# Patient Record
Sex: FEMALE | Race: WHITE | NOT HISPANIC OR LATINO | Employment: FULL TIME | URBAN - METROPOLITAN AREA
[De-identification: names, ages, dates, MRNs, and addresses within clinical notes are randomized per-mention and may not be internally consistent; named-entity substitution may affect disease eponyms.]

---

## 2019-02-13 ENCOUNTER — TELEPHONE (OUTPATIENT)
Dept: SCHEDULING | Facility: IMAGING CENTER | Age: 29
End: 2019-02-13

## 2019-03-21 ENCOUNTER — OFFICE VISIT (OUTPATIENT)
Dept: MEDICAL GROUP | Facility: PHYSICIAN GROUP | Age: 29
End: 2019-03-21
Payer: COMMERCIAL

## 2019-03-21 VITALS
TEMPERATURE: 97.9 F | SYSTOLIC BLOOD PRESSURE: 130 MMHG | DIASTOLIC BLOOD PRESSURE: 90 MMHG | RESPIRATION RATE: 18 BRPM | OXYGEN SATURATION: 100 % | HEART RATE: 96 BPM | HEIGHT: 66 IN | BODY MASS INDEX: 21.39 KG/M2 | WEIGHT: 133.1 LBS

## 2019-03-21 DIAGNOSIS — N94.10 DYSPAREUNIA, FEMALE: ICD-10-CM

## 2019-03-21 DIAGNOSIS — Z23 NEED FOR VACCINATION: ICD-10-CM

## 2019-03-21 DIAGNOSIS — Z83.42 FAMILY HISTORY OF HIGH CHOLESTEROL: ICD-10-CM

## 2019-03-21 DIAGNOSIS — Z97.5 PRESENCE OF INTRAUTERINE CONTRACEPTIVE DEVICE (IUD): ICD-10-CM

## 2019-03-21 DIAGNOSIS — R23.2 FLUSHING: ICD-10-CM

## 2019-03-21 PROCEDURE — 90471 IMMUNIZATION ADMIN: CPT | Performed by: FAMILY MEDICINE

## 2019-03-21 PROCEDURE — 99204 OFFICE O/P NEW MOD 45 MIN: CPT | Mod: 25 | Performed by: FAMILY MEDICINE

## 2019-03-21 PROCEDURE — 90714 TD VACC NO PRESV 7 YRS+ IM: CPT | Performed by: FAMILY MEDICINE

## 2019-03-21 ASSESSMENT — PATIENT HEALTH QUESTIONNAIRE - PHQ9: CLINICAL INTERPRETATION OF PHQ2 SCORE: 0

## 2019-03-21 NOTE — ASSESSMENT & PLAN NOTE
She had a Paragaard placed Jan 2014.  She is thinking of having kids and would like to have it removed.

## 2019-03-21 NOTE — ASSESSMENT & PLAN NOTE
She had two episodes of severe pain during intercourse.  She had sudden onset of severe pain that resolved after a few days.  Pain was worse with pressure but she had intercourse again and was fun.  She did not have any bleeding at the time.  She has had one additional episode of pain but not since.

## 2019-03-28 PROBLEM — R23.2 FLUSHING: Status: ACTIVE | Noted: 2019-03-28

## 2019-03-28 NOTE — PROGRESS NOTES
CC:  Flushing, pain with sex    HISTORY OF THE PRESENT ILLNESS: Patient is a 28 y.o. female. This pleasant patient is here today to establish care and discuss the following    Health Maintenance: Completed      Presence of intrauterine contraceptive device (IUD)  She had a Paragaard placed Jan 2014.  She is thinking of having kids and would like to have it removed.          Dyspareunia, female  She had two episodes of severe pain during intercourse.  She had sudden onset of severe pain that resolved after a few days.  Pain was worse with pressure but she had intercourse again and was fun.  She did not have any bleeding at the time.  She has had one additional episode of pain but not since.      Flushing  She describes frequent flushing with alcohol consumption.  It is not with all types of alcohol but can be with higher amounts of intake.  She is not aware of any other food allergies and has not had any throat symptoms.  Flushing is always associated with alcohol intake.  She is having regular periods.      Allergies: Amoxicillin and Pcn [penicillins]    No current [facility]-ordered outpatient prescriptions on file.     No current [employer]-ordered facility-administered medications on file.        History reviewed. No pertinent past medical history.    Past Surgical History:   Procedure Laterality Date   • APPENDECTOMY CHILD     • MENISCUS REPAIR Left        Social History   Substance Use Topics   • Smoking status: Never Smoker   • Smokeless tobacco: Never Used   • Alcohol use 2.4 oz/week     4 Glasses of wine per week      Comment: Socially       Social History     Social History Narrative           Family History   Problem Relation Age of Onset   • Drug abuse Mother    • Heart Disease Father         unsure, not in contact   • Hyperlipidemia Father    • Osteoporosis Maternal Grandmother    • Breast Cancer Paternal Aunt        ROS:     - Constitutional: Negative for fever, chills, unexpected weight change, and  "fatigue/generalized weakness.     - HEENT: Negative for headaches, vision changes, hearing changes, ear pain, ear discharge, rhinorrhea, sinus congestion, sore throat, and neck pain.      - Respiratory: Negative for cough, sputum production, chest congestion, dyspnea, wheezing, and crackles.      - Cardiovascular: Negative for chest pain, palpitations, orthopnea, and bilateral lower extremity edema.     - Gastrointestinal: Negative for heartburn, nausea, vomiting, abdominal pain, hematochezia, melena, diarrhea, constipation, and greasy/foul-smelling stools.     - Genitourinary: Negative for dysuria, polyuria, hematuria, pyuria, urinary urgency, and urinary incontinence.    - Musculoskeletal: Negative for myalgias, back pain, and joint pain.     - Skin: Negative for rash, itching, cyanotic skin color change.     - Neurological: Negative for dizziness, tingling, tremors, focal sensory deficit, focal weakness and headaches.     - Endo/Heme/Allergies: Does not bruise/bleed easily.     - Psychiatric/Behavioral: Negative for depression, suicidal/homicidal ideation and memory loss.        Exam: Blood pressure 130/90, pulse 96, temperature 36.6 °C (97.9 °F), temperature source Temporal, resp. rate 18, height 1.676 m (5' 6\"), weight 60.4 kg (133 lb 1.6 oz), last menstrual period 03/21/2019, SpO2 100 %. Body mass index is 21.48 kg/m².    General: Normal appearing. No distress.  HEENT: Normocephalic. Eyes conjunctiva clear lids without ptosis, pupils equal and reactive to light accommodation, ears normal shape and contour, canals are clear bilaterally, tympanic membranes are benign, nasal mucosa benign, oropharynx is without erythema, edema or exudates.   Neck: Supple without JVD or bruit. Thyroid is not enlarged.  Pulmonary: Clear to ausculation.  Normal effort. No rales, ronchi, or wheezing.  Cardiovascular: Regular rate and rhythm without murmur. Carotid and radial pulses are intact and equal bilaterally.  Abdomen: Soft, " nontender, nondistended. Normal bowel sounds. Liver and spleen are not palpable  Neurologic: Grossly nonfocal  Lymph: No cervical, supraclavicular or axillary lymph nodes are palpable  Skin: Warm and dry.  No obvious lesions.  Musculoskeletal: Normal gait. No extremity cyanosis, clubbing, or edema.  Psych: Normal mood and affect. Alert and oriented x3. Judgment and insight is normal.    Please note that this dictation was created using voice recognition software. I have made every reasonable attempt to correct obvious errors, but I expect that there are errors of grammar and possibly content that I did not discover before finalizing the note.      Assessment/Plan  1. Presence of intrauterine contraceptive device (IUD)  She is considering having this removed.  This can be done in this office if imaging is normal.  Gyn is also reasonable.    2. Dyspareunia, female  She has had a few episodes of dyspareunia that resolved after 1 day or a few hours.  She has not had imaging at that has been evaluated after 1 of these episodes in the past.  Suspect this may represent a ruptured cyst or other than a more chronic issue.  Because she has an IUD onto his head several episodes I have ordered a pelvic ultrasound to evaluate for any abnormalities.  For onset of severe pain she should be seen again.  - US-PELVIC COMPLETE (TRANSABDOMINAL/TRANSVAGINAL) (COMBO); Future    3. Flushing  I suspect that this is just a reaction to alcohol and that she may have decreased metabolism.  I have advised her that most alcohol reactions are not allergies, but she is concerned because she is not can drink safely.  She does not seem to have as a consistent response to all types of alcohol.  For this reason I have referred her to allergy at her request.  - REFERRAL TO ALLERGY  - Comp Metabolic Panel; Future  - CBC WITH DIFFERENTIAL; Future    4. Family history of high cholesterol  She does have a family history of high cholesterol.  She has not  had any recent labs.  A screening cholesterol lab is ordered for monitoring.  - Lipid Profile; Future  - Comp Metabolic Panel; Future    5. Need for vaccination  - TD =>8yo IM

## 2019-03-28 NOTE — ASSESSMENT & PLAN NOTE
She describes frequent flushing with alcohol consumption.  It is not with all types of alcohol but can be with higher amounts of intake.  She is not aware of any other food allergies and has not had any throat symptoms.  Flushing is always associated with alcohol intake.  She is having regular periods.

## 2019-04-02 ENCOUNTER — HOSPITAL ENCOUNTER (OUTPATIENT)
Dept: LAB | Facility: MEDICAL CENTER | Age: 29
End: 2019-04-02
Attending: FAMILY MEDICINE
Payer: COMMERCIAL

## 2019-04-02 DIAGNOSIS — R23.2 FLUSHING: ICD-10-CM

## 2019-04-02 DIAGNOSIS — Z83.42 FAMILY HISTORY OF HIGH CHOLESTEROL: ICD-10-CM

## 2019-04-02 LAB
ALBUMIN SERPL BCP-MCNC: 4.2 G/DL (ref 3.2–4.9)
ALBUMIN/GLOB SERPL: 1.8 G/DL
ALP SERPL-CCNC: 45 U/L (ref 30–99)
ALT SERPL-CCNC: 10 U/L (ref 2–50)
ANION GAP SERPL CALC-SCNC: 7 MMOL/L (ref 0–11.9)
AST SERPL-CCNC: 16 U/L (ref 12–45)
BASOPHILS # BLD AUTO: 1.2 % (ref 0–1.8)
BASOPHILS # BLD: 0.07 K/UL (ref 0–0.12)
BILIRUB SERPL-MCNC: 0.7 MG/DL (ref 0.1–1.5)
BUN SERPL-MCNC: 12 MG/DL (ref 8–22)
CALCIUM SERPL-MCNC: 8.8 MG/DL (ref 8.5–10.5)
CHLORIDE SERPL-SCNC: 106 MMOL/L (ref 96–112)
CHOLEST SERPL-MCNC: 147 MG/DL (ref 100–199)
CO2 SERPL-SCNC: 25 MMOL/L (ref 20–33)
CREAT SERPL-MCNC: 0.73 MG/DL (ref 0.5–1.4)
EOSINOPHIL # BLD AUTO: 0.12 K/UL (ref 0–0.51)
EOSINOPHIL NFR BLD: 2.1 % (ref 0–6.9)
ERYTHROCYTE [DISTWIDTH] IN BLOOD BY AUTOMATED COUNT: 41.2 FL (ref 35.9–50)
FASTING STATUS PATIENT QL REPORTED: NORMAL
GLOBULIN SER CALC-MCNC: 2.4 G/DL (ref 1.9–3.5)
GLUCOSE SERPL-MCNC: 87 MG/DL (ref 65–99)
HCT VFR BLD AUTO: 43.6 % (ref 37–47)
HDLC SERPL-MCNC: 60 MG/DL
HGB BLD-MCNC: 14.1 G/DL (ref 12–16)
IMM GRANULOCYTES # BLD AUTO: 0.02 K/UL (ref 0–0.11)
IMM GRANULOCYTES NFR BLD AUTO: 0.3 % (ref 0–0.9)
LDLC SERPL CALC-MCNC: 71 MG/DL
LYMPHOCYTES # BLD AUTO: 2.35 K/UL (ref 1–4.8)
LYMPHOCYTES NFR BLD: 40.9 % (ref 22–41)
MCH RBC QN AUTO: 29.1 PG (ref 27–33)
MCHC RBC AUTO-ENTMCNC: 32.3 G/DL (ref 33.6–35)
MCV RBC AUTO: 90.1 FL (ref 81.4–97.8)
MONOCYTES # BLD AUTO: 0.63 K/UL (ref 0–0.85)
MONOCYTES NFR BLD AUTO: 11 % (ref 0–13.4)
NEUTROPHILS # BLD AUTO: 2.56 K/UL (ref 2–7.15)
NEUTROPHILS NFR BLD: 44.5 % (ref 44–72)
NRBC # BLD AUTO: 0 K/UL
NRBC BLD-RTO: 0 /100 WBC
PLATELET # BLD AUTO: 292 K/UL (ref 164–446)
PMV BLD AUTO: 10.1 FL (ref 9–12.9)
POTASSIUM SERPL-SCNC: 4.3 MMOL/L (ref 3.6–5.5)
PROT SERPL-MCNC: 6.6 G/DL (ref 6–8.2)
RBC # BLD AUTO: 4.84 M/UL (ref 4.2–5.4)
SODIUM SERPL-SCNC: 138 MMOL/L (ref 135–145)
TRIGL SERPL-MCNC: 78 MG/DL (ref 0–149)
WBC # BLD AUTO: 5.8 K/UL (ref 4.8–10.8)

## 2019-04-02 PROCEDURE — 85025 COMPLETE CBC W/AUTO DIFF WBC: CPT

## 2019-04-02 PROCEDURE — 36415 COLL VENOUS BLD VENIPUNCTURE: CPT

## 2019-04-02 PROCEDURE — 80053 COMPREHEN METABOLIC PANEL: CPT

## 2019-04-02 PROCEDURE — 80061 LIPID PANEL: CPT

## 2019-11-19 ENCOUNTER — OFFICE VISIT (OUTPATIENT)
Dept: URGENT CARE | Facility: CLINIC | Age: 29
End: 2019-11-19
Payer: COMMERCIAL

## 2019-11-19 VITALS
TEMPERATURE: 98.3 F | SYSTOLIC BLOOD PRESSURE: 118 MMHG | OXYGEN SATURATION: 100 % | HEIGHT: 66 IN | RESPIRATION RATE: 12 BRPM | DIASTOLIC BLOOD PRESSURE: 82 MMHG | WEIGHT: 129 LBS | BODY MASS INDEX: 20.73 KG/M2 | HEART RATE: 80 BPM

## 2019-11-19 DIAGNOSIS — R05.9 COUGH: ICD-10-CM

## 2019-11-19 DIAGNOSIS — B02.9 HERPES ZOSTER WITHOUT COMPLICATION: ICD-10-CM

## 2019-11-19 PROCEDURE — 99214 OFFICE O/P EST MOD 30 MIN: CPT | Performed by: PHYSICIAN ASSISTANT

## 2019-11-19 RX ORDER — ALBUTEROL SULFATE 90 UG/1
2 AEROSOL, METERED RESPIRATORY (INHALATION) EVERY 6 HOURS PRN
Qty: 8.5 G | Refills: 0 | Status: SHIPPED | OUTPATIENT
Start: 2019-11-19

## 2019-11-19 RX ORDER — AZITHROMYCIN 250 MG/1
TABLET, FILM COATED ORAL
Qty: 6 TAB | Refills: 0 | Status: SHIPPED | OUTPATIENT
Start: 2019-11-19

## 2019-11-19 RX ORDER — VALACYCLOVIR HYDROCHLORIDE 1 G/1
1000 TABLET, FILM COATED ORAL 3 TIMES DAILY
Qty: 21 TAB | Refills: 0 | Status: SHIPPED | OUTPATIENT
Start: 2019-11-19 | End: 2019-11-26

## 2019-11-19 ASSESSMENT — ENCOUNTER SYMPTOMS
SORE THROAT: 1
COUGH: 1
WHEEZING: 0
SHORTNESS OF BREATH: 0

## 2019-11-19 ASSESSMENT — COPD QUESTIONNAIRES: COPD: 0

## 2019-11-20 NOTE — PATIENT INSTRUCTIONS
Shingles  Shingles is an infection that causes a painful skin rash and fluid-filled blisters. Shingles is caused by the same virus that causes chickenpox.  Shingles only develops in people who:  · Have had chickenpox.  · Have gotten the chickenpox vaccine. (This is rare.)  The first symptoms of shingles may be itching, tingling, or pain in an area on your skin. A rash will follow in a few days or weeks. The rash is usually on one side of the body in a bandlike or beltlike pattern. Over time, the rash turns into fluid-filled blisters that break open, scab over, and dry up. Medicines may:  · Help you manage pain.  · Help you recover more quickly.  · Help to prevent long-term problems.  Follow these instructions at home:  Medicines  · Take medicines only as told by your doctor.  · Apply an anti-itch or numbing cream to the affected area as told by your doctor.  Blister and Rash Care  · Take a cool bath or put cool compresses on the area of the rash or blisters as told by your doctor. This may help with pain and itching.  · Keep your rash covered with a loose bandage (dressing). Wear loose-fitting clothing.  · Keep your rash and blisters clean with mild soap and cool water or as told by your doctor.  · Check your rash every day for signs of infection. These include redness, swelling, and pain that lasts or gets worse.  · Do not pick your blisters.  · Do not scratch your rash.  General instructions  · Rest as told by your doctor.  · Keep all follow-up visits as told by your doctor. This is important.  · Until your blisters scab over, your infection can cause chickenpox in people who have never had it or been vaccinated against it. To prevent this from happening, avoid touching other people or being around other people, especially:  ¨ Babies.  ¨ Pregnant women.  ¨ Children who have eczema.  ¨ Elderly people who have transplants.  ¨ People who have chronic illnesses, such as leukemia or AIDS.  Contact a doctor if:  · Your  pain does not get better with medicine.  · Your pain does not get better after the rash heals.  · Your rash looks infected. Signs of infection include:  ¨ Redness.  ¨ Swelling.  ¨ Pain that lasts or gets worse.  Get help right away if:  · The rash is on your face or nose.  · You have pain in your face, pain around your eye area, or loss of feeling on one side of your face.  · You have ear pain or you have ringing in your ear.  · You have loss of taste.  · Your condition gets worse.  This information is not intended to replace advice given to you by your health care provider. Make sure you discuss any questions you have with your health care provider.  Document Released: 06/05/2009 Document Revised: 08/13/2017 Document Reviewed: 09/29/2015  [employer] Interactive Patient Education © 2017 [employer]

## 2019-11-20 NOTE — PROGRESS NOTES
"Subjective:   Lissa Falcon is a 29 y.o. female who presents for Cough and Rash        Fully vaccinated.  Thinks she had pertussis as a child 8-9. No known exposure.   Coughing episode last 20 minutes- 2 days.    Cough   This is a new problem. The current episode started yesterday. The problem has been gradually worsening. The cough is non-productive. Associated symptoms include nasal congestion (in morning) and a sore throat (\"scratchy\"). Pertinent negatives include no ear congestion, ear pain, postnasal drip, shortness of breath or wheezing. Nothing aggravates the symptoms. Treatments tried: zicam, cough drops. The treatment provided no relief. Her past medical history is significant for asthma (as a child), bronchitis and pneumonia. There is no history of COPD, emphysema or environmental allergies.     Review of Systems   HENT: Positive for sore throat (\"scratchy\"). Negative for ear pain and postnasal drip.    Respiratory: Positive for cough. Negative for shortness of breath and wheezing.    Endo/Heme/Allergies: Negative for environmental allergies.       PMH:  has a past medical history of Asthma.  MEDS:   Current Outpatient Medications:   •  valacyclovir (VALTREX) 1 GM Tab, Take 1 Tab by mouth 3 times a day for 7 days., Disp: 21 Tab, Rfl: 0  •  azithromycin (ZITHROMAX) 250 MG Tab, Take 2 tablets by mouth on day one. Take one tablet by mouth the remaining days until gone, Disp: 6 Tab, Rfl: 0  •  albuterol 108 (90 Base) MCG/ACT Aero Soln inhalation aerosol, Inhale 2 Puffs by mouth every 6 hours as needed for Shortness of Breath., Disp: 8.5 g, Rfl: 0  ALLERGIES:   Allergies   Allergen Reactions   • Amoxicillin Nausea   • Codeine    • Pcn [Penicillins] Vomiting     Mother has intolerance so the patient has not had this medication      SURGHX:   Past Surgical History:   Procedure Laterality Date   • APPENDECTOMY CHILD     • MENISCUS REPAIR Left      SOCHX:  reports that she has never smoked. She has never used " "smokeless tobacco. She reports current alcohol use of about 2.4 oz of alcohol per week. She reports current drug use. Frequency: 1.00 time per week. Drug: Marijuana.  FH: Family history was reviewed, no pertinent findings to report   Objective:   /82 (BP Location: Right arm, Patient Position: Sitting)   Pulse 80   Temp 36.8 °C (98.3 °F)   Resp 12   Ht 1.676 m (5' 6\")   Wt 58.5 kg (129 lb)   SpO2 100%   BMI 20.82 kg/m²   Physical Exam  Vitals signs reviewed.   Constitutional:       General: She is not in acute distress.     Appearance: Normal appearance. She is well-developed. She is not toxic-appearing.   HENT:      Head: Normocephalic and atraumatic.      Right Ear: Tympanic membrane, ear canal and external ear normal.      Left Ear: Tympanic membrane, ear canal and external ear normal.      Nose: Mucosal edema and rhinorrhea present. No congestion. Rhinorrhea is clear.      Mouth/Throat:      Lips: Pink.      Mouth: Mucous membranes are moist.      Pharynx: Oropharynx is clear. Uvula midline.   Eyes:      General: Lids are normal.      Conjunctiva/sclera: Conjunctivae normal.   Neck:      Musculoskeletal: Neck supple.   Cardiovascular:      Rate and Rhythm: Normal rate and regular rhythm.      Heart sounds: Normal heart sounds, S1 normal and S2 normal. No murmur. No friction rub. No gallop.    Pulmonary:      Effort: Pulmonary effort is normal. No respiratory distress.      Breath sounds: Normal breath sounds. No decreased breath sounds, wheezing, rhonchi or rales.      Comments: Cough not observed during exam  Musculoskeletal:      Comments: Normal range of motion. Exhibits no edema and no tenderness.    Lymphadenopathy:      Cervical: No cervical adenopathy.      Right cervical: No superficial or posterior cervical adenopathy.     Left cervical: No superficial or posterior cervical adenopathy.   Skin:     General: Skin is warm and dry.      Capillary Refill: Capillary refill takes less than 2 " seconds.          Neurological:      Mental Status: She is alert and oriented to person, place, and time.      Cranial Nerves: No cranial nerve deficit.      Sensory: No sensory deficit.   Psychiatric:         Speech: Speech normal.         Behavior: Behavior normal.         Thought Content: Thought content normal.         Judgment: Judgment normal.           Assessment/Plan:   1. Cough  - azithromycin (ZITHROMAX) 250 MG Tab; Take 2 tablets by mouth on day one. Take one tablet by mouth the remaining days until gone  Dispense: 6 Tab; Refill: 0  - albuterol 108 (90 Base) MCG/ACT Aero Soln inhalation aerosol; Inhale 2 Puffs by mouth every 6 hours as needed for Shortness of Breath.  Dispense: 8.5 g; Refill: 0    2. Herpes zoster without complication  - valacyclovir (VALTREX) 1 GM Tab; Take 1 Tab by mouth 3 times a day for 7 days.  Dispense: 21 Tab; Refill: 0    1.  Patient describes 2 episodes of prolonged cough with inspiratory whoop.  No history of catarrhal symptoms/ prodrome.  I did review patient's records she received a Td last March- pertussis is out of date. 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  Clinical suspicion for pertussis is low, but due to described symptoms and presence within the community I will start her on azithromycin as precaution. Viral illness +/- bronchospasm is another consideration- she does have hx of exercise induced asthma. Treat congestion with OTC meds and trial inhaler.  Patient develops new or worsening symptoms or current symptoms fail to improve in 48 hours return to clinic for reevaluation.    2. Physical exam suggestive of shingles.  Patient started on Valtrex. May also take NSAIDs.  Follow-up with PCP later this week to ensure lesions are resolving.  No evidence of ocular or otic involvement.  Patient provided with educational handout. Etiology and expected course of illness discussed with patient.  Patient was  advised that he is contagious and that virus typically spreads through another party's direct contact with the lesion.  Patient also advised that illness is dangerous to both immunocompromised and pregnant women.  she should avoid contact with these parties until lesions fully resolve.    If symptoms fail to improve in 48-72 hours, worsen or new symptoms develop RTC for reevaluation.    Differential diagnosis, natural history, supportive care, and indications for immediate follow-up discussed.